# Patient Record
(demographics unavailable — no encounter records)

---

## 2024-12-10 NOTE — DISCUSSION/SUMMARY
[FreeTextEntry1] : 2y M seen for acute visit. URI with cough and congestion, likely viral. Eyes are watery but no signs of conjunctivitis. He is afebrile, handling well. Supportive care. - Discussed with pt / family to observe for signs and symptoms of respiratory distress including the following:  shortness of breath, increased respiratory rate, wheezing, difficulty breathing, sternal notch/intercostal retractions, and/or accessory muscle use during respiration. -Activate EMS or take to ED if stable for prompt medical attention if s/s distress develop. RTO PRN persistent, worsening, or new concerning symptoms.

## 2024-12-10 NOTE — HISTORY OF PRESENT ILLNESS
[de-identified] : red and itchy eyes x1 day. Pt. has lingering cough.  [FreeTextEntry6] : cough, congestion, rhinorrhea x several days. C/O eyes bothering him this morning- itchy and watery. Afebrile. Drinking ok. Normal elimination. Acting himself. Sister with URI.

## 2024-12-10 NOTE — PHYSICAL EXAM
[Increased Tearing] : increased tearing [Mucoid Discharge] : mucoid discharge [Inflamed Nasal Mucosa] : inflamed nasal mucosa [Transmitted Upper Airway Sounds] : transmitted upper airway sounds [NL] : warm, clear [FreeTextEntry4] : congested

## 2025-07-01 NOTE — BEGINNING OF VISIT
[Mother] : mother Abdomen soft, non-tender and non-distended, no rebound, no guarding and no masses. no hepatosplenomegaly.

## 2025-07-01 NOTE — PHYSICAL EXAM

## 2025-07-03 NOTE — DISCUSSION/SUMMARY
[Normal Growth] : growth [Normal Development] : development [None] : No known medical problems [No Elimination Concerns] : elimination [No Feeding Concerns] : feeding [No Skin Concerns] : skin [Normal Sleep Pattern] : sleep [No Medications] : ~He/She~ is not on any medications [Parent/Guardian] : parent/guardian [Family Support] : family support [Encouraging Literacy Activities] : encouraging literacy activities [Playing with Peers] : playing with peers [Promoting Physical Activity] : promoting physical activity [Safety] : safety [Mother] : mother [FreeTextEntry1] : Continue balanced diet with all food groups. Brush teeth twice a day with toothbrush. Recommend visit to dentist. As per car seat 's guidelines, use forward-facing car seat in back seat of car. Switch to booster seat when child reaches highest weight/height for seat. Child needs to ride in a belt-positioning booster seat until  4 feet 9 inches has been reached and are between 8 and 12 years of age. Put toddler to sleep in own bed. Help toddler to maintain consistent daily routines and sleep schedule. Pre-K discussed. Ensure home is safe. Use consistent, positive discipline. Read aloud to toddler. Limit screen time to no more than 2 hours per day.   Vaccines UTD   Coordination of care reviewed   Cardiac reviewed- no increased risk for SCD   5-2-1-0 reviewed   Not able to obtain [place] or hearing screening- normal evaluation at audiology last year  Advised mother to reach to school for speech evaluation  Return in 1 year for WCC or sooner if any concerns

## 2025-07-03 NOTE — DEVELOPMENTAL MILESTONES
[Normal Development] : Normal Development [Yes: _______] : yes, [unfilled] [Plays and shares with others] : plays and shares with others [Put on coat, jacket, or shirt by self] : puts on coat, jacket, or shirt by self [Begins to play make-believe] : begins to play make-believe [Eats independently] : eats independently [Uses 3-word sentences] : uses 3-word sentences [Understands simple prepositions] : understands simple prepositions [Tells a story from a book or TV] : tells a story from a book or TV [Compares things using words such] : compares things using words such as bigger or shorter [Climbs on and off couch] : climbs on and off couch or chair [Jumps forward] : jumps forward [Draws a single [place]] : draws a single [place] [Goes to the bathroom and urinates] : does not go to bathroom and urinates by self [Uses words that are 75% intelligible] : does not use words that are 75% intelligible to strangers [FreeTextEntry1] : [place] [facility] reviewed- articulation difficulty otherwise meeting all milestones

## 2025-07-03 NOTE — HISTORY OF PRESENT ILLNESS
[Mother] : mother [whole ___ oz/d] : consumes [unfilled] oz of whole cow's milk per day [Fruit] : fruit [Vegetables] : vegetables [Meat] : meat [Dairy] : dairy [Vitamin] : Patient takes vitamin daily [Normal] : Normal [In bed] : In bed [Brushing teeth] : Brushing teeth [Toothpaste] : Primary Fluoride Source: Toothpaste [Playtime (60 min/d)] : Playtime 60 min a day [< 2 hrs of screen time] : Less than 2 hrs of screen time [No] : Not at  exposure [Water heater temperature set at <120 degrees F] : Water heater temperature set at <120 degrees F [Car seat in back seat] : Car seat in back seat [Smoke Detectors] : Smoke detectors [Supervised play near cars and streets] : Supervised play near cars and streets [Carbon Monoxide Detectors] : Carbon monoxide detectors [Up to date] : Up to date [NO] : No [Exposure to electronic nicotine delivery system] : No exposure to electronic nicotine delivery system [FreeTextEntry7] : 3 y.o [facility] Seen by audiologist for failed hearing screen at 2 year old visit- normal evaluation [de-identified] : Speech concerns- difficulty with articulation, difficult to understand [FreeTextEntry9] : nursery school in the Fall

## 2025-07-03 NOTE — DEVELOPMENTAL MILESTONES
[Normal Development] : Normal Development [Yes: _______] : yes, [unfilled] [Plays and shares with others] : plays and shares with others [Put on coat, jacket, or shirt by self] : puts on coat, jacket, or shirt by self [Begins to play make-believe] : begins to play make-believe [Eats independently] : eats independently [Uses 3-word sentences] : uses 3-word sentences [Understands simple prepositions] : understands simple prepositions [Tells a story from a book or TV] : tells a story from a book or TV [Compares things using words such] : compares things using words such as bigger or shorter [Climbs on and off couch] : climbs on and off couch or chair [Jumps forward] : jumps forward [Draws a single [place]] : draws a single [place] [Goes to the bathroom and urinates] : does not go to bathroom and urinates by self [Uses words that are 75% intelligible] : does not use words that are 75% intelligible to strangers [FreeTextEntry1] : [place] [facility] reviewed- articulation difficulty otherwise meeting all milestones

## 2025-07-03 NOTE — HISTORY OF PRESENT ILLNESS
[Mother] : mother [whole ___ oz/d] : consumes [unfilled] oz of whole cow's milk per day [Fruit] : fruit [Vegetables] : vegetables [Meat] : meat [Dairy] : dairy [Vitamin] : Patient takes vitamin daily [Normal] : Normal [In bed] : In bed [Brushing teeth] : Brushing teeth [Toothpaste] : Primary Fluoride Source: Toothpaste [Playtime (60 min/d)] : Playtime 60 min a day [< 2 hrs of screen time] : Less than 2 hrs of screen time [No] : Not at  exposure [Water heater temperature set at <120 degrees F] : Water heater temperature set at <120 degrees F [Car seat in back seat] : Car seat in back seat [Smoke Detectors] : Smoke detectors [Supervised play near cars and streets] : Supervised play near cars and streets [Carbon Monoxide Detectors] : Carbon monoxide detectors [Up to date] : Up to date [NO] : No [Exposure to electronic nicotine delivery system] : No exposure to electronic nicotine delivery system [FreeTextEntry7] : 3 y.o [facility] Seen by audiologist for failed hearing screen at 2 year old visit- normal evaluation [de-identified] : Speech concerns- difficulty with articulation, difficult to understand [FreeTextEntry9] : nursery school in the Fall